# Patient Record
Sex: MALE | Race: BLACK OR AFRICAN AMERICAN | NOT HISPANIC OR LATINO | Employment: FULL TIME | RURAL
[De-identification: names, ages, dates, MRNs, and addresses within clinical notes are randomized per-mention and may not be internally consistent; named-entity substitution may affect disease eponyms.]

---

## 2021-12-12 ENCOUNTER — OFFICE VISIT (OUTPATIENT)
Dept: FAMILY MEDICINE | Facility: CLINIC | Age: 46
End: 2021-12-12
Payer: COMMERCIAL

## 2021-12-12 VITALS
OXYGEN SATURATION: 98 % | TEMPERATURE: 98 F | RESPIRATION RATE: 18 BRPM | DIASTOLIC BLOOD PRESSURE: 81 MMHG | SYSTOLIC BLOOD PRESSURE: 121 MMHG | HEART RATE: 84 BPM

## 2021-12-12 DIAGNOSIS — Z20.828 CONTACT WITH OR EXPOSURE TO VIRAL DISEASE: Primary | ICD-10-CM

## 2021-12-12 LAB
CTP QC/QA: YES
FLUAV AG NPH QL: NEGATIVE
FLUBV AG NPH QL: NEGATIVE
SARS-COV-2 AG RESP QL IA.RAPID: NEGATIVE

## 2021-12-12 PROCEDURE — 99203 PR OFFICE/OUTPT VISIT, NEW, LEVL III, 30-44 MIN: ICD-10-PCS | Mod: ,,, | Performed by: FAMILY MEDICINE

## 2021-12-12 PROCEDURE — 99203 OFFICE O/P NEW LOW 30 MIN: CPT | Mod: ,,, | Performed by: FAMILY MEDICINE

## 2021-12-12 PROCEDURE — 99051 MED SERV EVE/WKEND/HOLIDAY: CPT | Mod: ,,, | Performed by: FAMILY MEDICINE

## 2021-12-12 PROCEDURE — 87428 POCT SARS-COV2 (COVID) WITH FLU ANTIGEN: ICD-10-PCS | Mod: QW,,, | Performed by: FAMILY MEDICINE

## 2021-12-12 PROCEDURE — 99051 PR MEDICAL SERVICES, EVE/WKEND/HOLIDAY: ICD-10-PCS | Mod: ,,, | Performed by: FAMILY MEDICINE

## 2021-12-12 PROCEDURE — 87428 SARSCOV & INF VIR A&B AG IA: CPT | Mod: QW,,, | Performed by: FAMILY MEDICINE

## 2021-12-15 ENCOUNTER — OFFICE VISIT (OUTPATIENT)
Dept: FAMILY MEDICINE | Facility: CLINIC | Age: 46
End: 2021-12-15
Payer: COMMERCIAL

## 2021-12-15 VITALS
DIASTOLIC BLOOD PRESSURE: 70 MMHG | OXYGEN SATURATION: 96 % | RESPIRATION RATE: 18 BRPM | TEMPERATURE: 98 F | HEART RATE: 73 BPM | SYSTOLIC BLOOD PRESSURE: 146 MMHG

## 2021-12-15 DIAGNOSIS — Z20.828 CONTACT WITH OR EXPOSURE TO VIRAL DISEASE: Primary | ICD-10-CM

## 2021-12-15 PROCEDURE — 87428 SARSCOV & INF VIR A&B AG IA: CPT | Mod: QW,,, | Performed by: NURSE PRACTITIONER

## 2021-12-15 PROCEDURE — 87428 POCT SARS-COV2 (COVID) WITH FLU ANTIGEN: ICD-10-PCS | Mod: QW,,, | Performed by: NURSE PRACTITIONER

## 2021-12-15 PROCEDURE — 99202 OFFICE O/P NEW SF 15 MIN: CPT | Mod: ,,, | Performed by: NURSE PRACTITIONER

## 2021-12-15 PROCEDURE — 99202 PR OFFICE/OUTPT VISIT, NEW, LEVL II, 15-29 MIN: ICD-10-PCS | Mod: ,,, | Performed by: NURSE PRACTITIONER

## 2022-05-05 ENCOUNTER — OFFICE VISIT (OUTPATIENT)
Dept: FAMILY MEDICINE | Facility: CLINIC | Age: 47
End: 2022-05-05
Payer: COMMERCIAL

## 2022-05-05 VITALS
HEART RATE: 104 BPM | SYSTOLIC BLOOD PRESSURE: 144 MMHG | WEIGHT: 208.81 LBS | HEIGHT: 70 IN | DIASTOLIC BLOOD PRESSURE: 92 MMHG | BODY MASS INDEX: 29.89 KG/M2 | OXYGEN SATURATION: 96 % | TEMPERATURE: 98 F

## 2022-05-05 DIAGNOSIS — R05.9 COUGH: Primary | ICD-10-CM

## 2022-05-05 DIAGNOSIS — R22.2 LOCALIZED SWELLING, MASS AND LUMP, TRUNK: ICD-10-CM

## 2022-05-05 DIAGNOSIS — J22 LOWER RESPIRATORY INFECTION: ICD-10-CM

## 2022-05-05 LAB
ALBUMIN SERPL BCP-MCNC: 3.9 G/DL (ref 3.5–5)
ALBUMIN/GLOB SERPL: 1.1 {RATIO}
ALP SERPL-CCNC: 70 U/L (ref 45–115)
ALT SERPL W P-5'-P-CCNC: 57 U/L (ref 16–61)
ANION GAP SERPL CALCULATED.3IONS-SCNC: 11 MMOL/L (ref 7–16)
AST SERPL W P-5'-P-CCNC: 50 U/L (ref 15–37)
BILIRUB SERPL-MCNC: 1.2 MG/DL (ref 0–1.2)
BUN SERPL-MCNC: 19 MG/DL (ref 7–18)
BUN/CREAT SERPL: 13 (ref 6–20)
CALCIUM SERPL-MCNC: 8.9 MG/DL (ref 8.5–10.1)
CHLORIDE SERPL-SCNC: 104 MMOL/L (ref 98–107)
CO2 SERPL-SCNC: 27 MMOL/L (ref 21–32)
CREAT SERPL-MCNC: 1.49 MG/DL (ref 0.7–1.3)
CTP QC/QA: YES
FLUAV AG NPH QL: NEGATIVE
FLUBV AG NPH QL: NEGATIVE
GLOBULIN SER-MCNC: 3.7 G/DL (ref 2–4)
GLUCOSE SERPL-MCNC: 93 MG/DL (ref 74–106)
POTASSIUM SERPL-SCNC: 3.9 MMOL/L (ref 3.5–5.1)
PROT SERPL-MCNC: 7.6 G/DL (ref 6.4–8.2)
SARS-COV-2 AG RESP QL IA.RAPID: NEGATIVE
SODIUM SERPL-SCNC: 138 MMOL/L (ref 136–145)

## 2022-05-05 PROCEDURE — 3080F PR MOST RECENT DIASTOLIC BLOOD PRESSURE >= 90 MM HG: ICD-10-PCS | Mod: ,,, | Performed by: NURSE PRACTITIONER

## 2022-05-05 PROCEDURE — 3008F PR BODY MASS INDEX (BMI) DOCUMENTED: ICD-10-PCS | Mod: ,,, | Performed by: NURSE PRACTITIONER

## 2022-05-05 PROCEDURE — 3080F DIAST BP >= 90 MM HG: CPT | Mod: ,,, | Performed by: NURSE PRACTITIONER

## 2022-05-05 PROCEDURE — 1159F MED LIST DOCD IN RCRD: CPT | Mod: ,,, | Performed by: NURSE PRACTITIONER

## 2022-05-05 PROCEDURE — 1160F PR REVIEW ALL MEDS BY PRESCRIBER/CLIN PHARMACIST DOCUMENTED: ICD-10-PCS | Mod: ,,, | Performed by: NURSE PRACTITIONER

## 2022-05-05 PROCEDURE — 87428 POCT SARS-COV2 (COVID) WITH FLU ANTIGEN: ICD-10-PCS | Mod: QW,,, | Performed by: NURSE PRACTITIONER

## 2022-05-05 PROCEDURE — 99051 MED SERV EVE/WKEND/HOLIDAY: CPT | Mod: ,,, | Performed by: NURSE PRACTITIONER

## 2022-05-05 PROCEDURE — 3077F SYST BP >= 140 MM HG: CPT | Mod: ,,, | Performed by: NURSE PRACTITIONER

## 2022-05-05 PROCEDURE — 99051 PR MEDICAL SERVICES, EVE/WKEND/HOLIDAY: ICD-10-PCS | Mod: ,,, | Performed by: NURSE PRACTITIONER

## 2022-05-05 PROCEDURE — 87428 SARSCOV & INF VIR A&B AG IA: CPT | Mod: QW,,, | Performed by: NURSE PRACTITIONER

## 2022-05-05 PROCEDURE — 80053 COMPREHEN METABOLIC PANEL: CPT | Mod: ,,, | Performed by: CLINICAL MEDICAL LABORATORY

## 2022-05-05 PROCEDURE — 3077F PR MOST RECENT SYSTOLIC BLOOD PRESSURE >= 140 MM HG: ICD-10-PCS | Mod: ,,, | Performed by: NURSE PRACTITIONER

## 2022-05-05 PROCEDURE — 1159F PR MEDICATION LIST DOCUMENTED IN MEDICAL RECORD: ICD-10-PCS | Mod: ,,, | Performed by: NURSE PRACTITIONER

## 2022-05-05 PROCEDURE — 1160F RVW MEDS BY RX/DR IN RCRD: CPT | Mod: ,,, | Performed by: NURSE PRACTITIONER

## 2022-05-05 PROCEDURE — 80053 COMPREHENSIVE METABOLIC PANEL: ICD-10-PCS | Mod: ,,, | Performed by: CLINICAL MEDICAL LABORATORY

## 2022-05-05 PROCEDURE — 99214 OFFICE O/P EST MOD 30 MIN: CPT | Mod: ,,, | Performed by: NURSE PRACTITIONER

## 2022-05-05 PROCEDURE — 3008F BODY MASS INDEX DOCD: CPT | Mod: ,,, | Performed by: NURSE PRACTITIONER

## 2022-05-05 PROCEDURE — 99214 PR OFFICE/OUTPT VISIT, EST, LEVL IV, 30-39 MIN: ICD-10-PCS | Mod: ,,, | Performed by: NURSE PRACTITIONER

## 2022-05-05 RX ORDER — PROMETHAZINE HYDROCHLORIDE AND DEXTROMETHORPHAN HYDROBROMIDE 6.25; 15 MG/5ML; MG/5ML
5 SYRUP ORAL EVERY 6 HOURS PRN
Qty: 150 ML | Refills: 0 | Status: SHIPPED | OUTPATIENT
Start: 2022-05-05 | End: 2022-05-15

## 2022-05-05 RX ORDER — ALBUTEROL SULFATE 90 UG/1
2 AEROSOL, METERED RESPIRATORY (INHALATION) EVERY 6 HOURS PRN
Qty: 18 G | Refills: 5 | Status: SHIPPED | OUTPATIENT
Start: 2022-05-05 | End: 2023-02-08 | Stop reason: SDUPTHER

## 2022-05-05 RX ORDER — METHYLPREDNISOLONE 4 MG/1
TABLET ORAL
Qty: 21 EACH | Refills: 0 | Status: SHIPPED | OUTPATIENT
Start: 2022-05-05 | End: 2022-05-26

## 2022-05-05 RX ORDER — LEVOFLOXACIN 750 MG/1
750 TABLET ORAL DAILY
Qty: 7 TABLET | Refills: 0 | Status: SHIPPED | OUTPATIENT
Start: 2022-05-05 | End: 2022-05-12

## 2022-05-05 NOTE — PROGRESS NOTES
"Subjective:       Patient ID: Franky Torres is a 47 y.o. male.    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 No fever. Cough is dry. No hx of asthma. No sinus congestion or PND.      Review of Systems   Constitutional: Negative for chills, fever and malaise/fatigue.   HENT: Negative.    Respiratory: Positive for cough. Negative for hemoptysis, sputum production, shortness of breath and wheezing.    Cardiovascular: Negative.    Musculoskeletal: Negative.    Neurological: Negative.           Reviewed family, medical, surgical, and social history.    Objective:      BP (!) 144/92 (BP Location: Left arm, Patient Position: Sitting, BP Method: Large (Automatic))   Pulse 104   Temp 97.6 °F (36.4 °C) (Oral)   Ht 5' 10" (1.778 m)   Wt 94.7 kg (208 lb 12.8 oz)   SpO2 96%   BMI 29.96 kg/m²   Physical Exam  Vitals and nursing note reviewed.   Constitutional:       General: He is not in acute distress.     Appearance: Normal appearance. He is normal weight. He is not ill-appearing, toxic-appearing or diaphoretic.   HENT:      Head: Normocephalic.      Right Ear: Tympanic membrane, ear canal and external ear normal.      Left Ear: Tympanic membrane, ear canal and external ear normal.      Nose: No congestion or rhinorrhea.      Mouth/Throat:      Mouth: Mucous membranes are moist.   Cardiovascular:      Rate and Rhythm: Normal rate and regular rhythm.      Heart sounds: Normal heart sounds.   Pulmonary:      Effort: Pulmonary effort is normal. No respiratory distress.      Breath sounds: No stridor. Wheezing present. No rhonchi or rales.   Chest:      Chest wall: No tenderness.   Musculoskeletal:      Cervical back: Normal range of motion and neck supple.   Skin:     General: Skin is warm and dry.   Neurological:      Mental Status: He is alert and oriented to person, place, and time.   Psychiatric:   "       Mood and Affect: Mood normal.         Behavior: Behavior normal.         Thought Content: Thought content normal.         Judgment: Judgment normal.            Office Visit on 05/05/2022   Component Date Value Ref Range Status    SARS Coronavirus 2 Antigen 05/05/2022 Negative  Negative Final    Rapid Influenza A Ag 05/05/2022 Negative  Negative Final    Rapid Influenza B Ag 05/05/2022 Negative  Negative Final     Acceptable 05/05/2022 Yes   Final      Assessment:       1. Cough    2. Localized swelling, mass and lump, trunk    3. Lower respiratory infection        Plan:       Cough  -     X-Ray Chest PA And Lateral; Future; Expected date: 05/05/2022  -     methylPREDNISolone (MEDROL DOSEPACK) 4 mg tablet; use as directed  Dispense: 21 each; Refill: 0  -     albuterol (PROVENTIL HFA) 90 mcg/actuation inhaler; Inhale 2 puffs into the lungs every 6 (six) hours as needed for Wheezing. Rescue  Dispense: 18 g; Refill: 5  -     Ambulatory referral/consult to Pulmonology; Future; Expected date: 05/12/2022  -     Cancel: CT Chest Without Contrast; Future; Expected date: 05/05/2022  -     levoFLOXacin (LEVAQUIN) 750 MG tablet; Take 1 tablet (750 mg total) by mouth once daily. for 7 days  Dispense: 7 tablet; Refill: 0    Localized swelling, mass and lump, trunk  -     Cancel: CT Chest Without Contrast; Future; Expected date: 05/05/2022  -     Comprehensive Metabolic Panel; Future; Expected date: 05/05/2022  -     levoFLOXacin (LEVAQUIN) 750 MG tablet; Take 1 tablet (750 mg total) by mouth once daily. for 7 days  Dispense: 7 tablet; Refill: 0  -     CT Chest With Contrast; Future; Expected date: 05/05/2022    Lower respiratory infection  -     levoFLOXacin (LEVAQUIN) 750 MG tablet; Take 1 tablet (750 mg total) by mouth once daily. for 7 days  Dispense: 7 tablet; Refill: 0  -     POCT SARS-COV2 (COVID) with Flu Antigen    Other orders  -     promethazine-dextromethorphan (PROMETHAZINE-DM) 6.25-15 mg/5 mL  Syrp; Take 5 mLs by mouth every 6 (six) hours as needed (cough).  Dispense: 150 mL; Refill: 0    I will call with xray results  F/U with referral clerk in 1 week  RTC PRN          Risks, benefits, and side effects were discussed with the patient. All questions were answered to the fullest satisfaction of the patient, and pt verbalized understanding and agreement to treatment plan. Pt was to call with any new or worsening symptoms, or present to the ER.

## 2022-05-05 NOTE — LETTER
May 5, 2022      [facility]  [address] [place] [address]  Phone: [phone]  Fax: [phone]       Patient: Franky Torres   YOB: 1975  Date of Visit: 05/05/2022    To Whom It May Concern:    Pako Torres  was at [facility] on 05/05/2022. The patient may return to work/school on 06/11/2022 with no restrictions. If you have any questions or concerns, or if I can be of further assistance, please do not hesitate to contact me.    Sincerely,    Trupti Vasquez, RT

## 2022-05-07 NOTE — PROGRESS NOTES
Notify of mildly elevated BUN and Creatinine. Any hx of kidney disease or HTN?. Monitor BP at home daily and notify me if consistently greater than 140/90.

## 2022-05-09 ENCOUNTER — TELEPHONE (OUTPATIENT)
Dept: FAMILY MEDICINE | Facility: CLINIC | Age: 47
End: 2022-05-09
Payer: COMMERCIAL

## 2022-05-09 NOTE — TELEPHONE ENCOUNTER
Identify patient by name and . Results was given. Responded well and voiced understanding. No hx of kidney disease or HTN.----- Message from KIRBY Obrien sent at 2022  8:14 AM CDT -----  Notify of mildly elevated BUN and Creatinine. Any hx of kidney disease or HTN?. Monitor BP at home daily and notify me if consistently greater than 140/90.

## 2022-05-16 ENCOUNTER — HOSPITAL ENCOUNTER (OUTPATIENT)
Dept: RADIOLOGY | Facility: HOSPITAL | Age: 47
Discharge: HOME | End: 2022-05-16
Attending: NURSE PRACTITIONER
Payer: COMMERCIAL

## 2022-05-16 DIAGNOSIS — R22.2 LOCALIZED SWELLING, MASS AND LUMP, TRUNK: ICD-10-CM

## 2022-05-16 PROCEDURE — 71260 CT CHEST WITH CONTRAST: ICD-10-PCS | Mod: 26,,,

## 2022-05-16 PROCEDURE — 71260 CT THORAX DX C+: CPT | Mod: 26,,,

## 2022-05-16 PROCEDURE — 25500020 PHARM REV CODE 255: Performed by: NURSE PRACTITIONER

## 2022-05-16 PROCEDURE — 71260 CT THORAX DX C+: CPT | Mod: TC

## 2022-05-16 RX ADMIN — IOPAMIDOL 75 ML: 755 INJECTION, SOLUTION INTRAVENOUS at 10:05

## 2022-05-18 ENCOUNTER — CLINICAL SUPPORT (OUTPATIENT)
Dept: PRIMARY CARE CLINIC | Facility: CLINIC | Age: 47
End: 2022-05-18

## 2022-05-18 DIAGNOSIS — Z02.89 ENCOUNTER FOR PHYSICAL EXAMINATION RELATED TO EMPLOYMENT: Primary | ICD-10-CM

## 2022-05-18 DIAGNOSIS — Z46.89 ENCOUNTER FOR FITTING AND ADJUSTMENT OF OTHER SPECIFIED DEVICES: ICD-10-CM

## 2022-05-18 DIAGNOSIS — Z01.00 ENCOUNTER FOR VISION SCREENING: ICD-10-CM

## 2022-05-18 DIAGNOSIS — Z02.83 ENCOUNTER FOR DRUG SCREENING: ICD-10-CM

## 2022-05-18 PROCEDURE — 99499 UNLISTED E&M SERVICE: CPT | Mod: ,,, | Performed by: NURSE PRACTITIONER

## 2022-05-18 PROCEDURE — 99172 PR VISUAL FUNCT SCREENING, BILAT: ICD-10-PCS | Mod: ,,, | Performed by: NURSE PRACTITIONER

## 2022-05-18 PROCEDURE — 99172 OCULAR FUNCTION SCREEN: CPT | Mod: ,,, | Performed by: NURSE PRACTITIONER

## 2022-05-18 PROCEDURE — 99080 SPECIAL REPORTS OR FORMS: CPT | Mod: ,,, | Performed by: NURSE PRACTITIONER

## 2022-05-18 PROCEDURE — 99499 PR PHYSICAL - BASIC NON DOT/CDL: ICD-10-PCS | Mod: ,,, | Performed by: NURSE PRACTITIONER

## 2022-05-18 PROCEDURE — 94010 BREATHING CAPACITY TEST: ICD-10-PCS | Mod: ,,, | Performed by: NURSE PRACTITIONER

## 2022-05-18 PROCEDURE — 99000 PR URINE DRUG SCREEN COLLECTION: ICD-10-PCS | Mod: ,,, | Performed by: NURSE PRACTITIONER

## 2022-05-18 PROCEDURE — 94010 BREATHING CAPACITY TEST: CPT | Mod: ,,, | Performed by: NURSE PRACTITIONER

## 2022-05-18 PROCEDURE — 99080 OSHA QUESTIONNAIRE: ICD-10-PCS | Mod: ,,, | Performed by: NURSE PRACTITIONER

## 2022-05-18 PROCEDURE — 99000 SPECIMEN HANDLING OFFICE-LAB: CPT | Mod: ,,, | Performed by: NURSE PRACTITIONER

## 2022-05-18 NOTE — PROGRESS NOTES
Subjective:       Patient ID: Franky Torres is a 47 y.o. male.    Chief Complaint: No chief complaint on file.    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

## 2022-05-20 ENCOUNTER — TELEPHONE (OUTPATIENT)
Dept: FAMILY MEDICINE | Facility: CLINIC | Age: 47
End: 2022-05-20
Payer: COMMERCIAL

## 2022-05-20 NOTE — TELEPHONE ENCOUNTER
Pt viewed results on my chart.----- Message from KIRBY Obrien sent at 5/16/2022 12:31 PM CDT -----  Notify normal CT. How is cough? If not better, I will refer to pulmonology

## 2022-06-20 ENCOUNTER — OFFICE VISIT (OUTPATIENT)
Dept: PULMONOLOGY | Facility: CLINIC | Age: 47
End: 2022-06-20
Payer: COMMERCIAL

## 2022-06-20 VITALS
RESPIRATION RATE: 22 BRPM | HEIGHT: 70 IN | WEIGHT: 208 LBS | OXYGEN SATURATION: 98 % | HEART RATE: 80 BPM | DIASTOLIC BLOOD PRESSURE: 84 MMHG | BODY MASS INDEX: 29.78 KG/M2 | SYSTOLIC BLOOD PRESSURE: 118 MMHG

## 2022-06-20 DIAGNOSIS — R05.9 COUGH: ICD-10-CM

## 2022-06-20 PROCEDURE — 99203 OFFICE O/P NEW LOW 30 MIN: CPT | Mod: S$PBB,,, | Performed by: INTERNAL MEDICINE

## 2022-06-20 PROCEDURE — 3008F BODY MASS INDEX DOCD: CPT | Mod: ,,, | Performed by: INTERNAL MEDICINE

## 2022-06-20 PROCEDURE — 1159F MED LIST DOCD IN RCRD: CPT | Mod: ,,, | Performed by: INTERNAL MEDICINE

## 2022-06-20 PROCEDURE — 3074F SYST BP LT 130 MM HG: CPT | Mod: ,,, | Performed by: INTERNAL MEDICINE

## 2022-06-20 PROCEDURE — 99203 PR OFFICE/OUTPT VISIT, NEW, LEVL III, 30-44 MIN: ICD-10-PCS | Mod: S$PBB,,, | Performed by: INTERNAL MEDICINE

## 2022-06-20 PROCEDURE — 3079F DIAST BP 80-89 MM HG: CPT | Mod: ,,, | Performed by: INTERNAL MEDICINE

## 2022-06-20 PROCEDURE — 3074F PR MOST RECENT SYSTOLIC BLOOD PRESSURE < 130 MM HG: ICD-10-PCS | Mod: ,,, | Performed by: INTERNAL MEDICINE

## 2022-06-20 PROCEDURE — 99214 OFFICE O/P EST MOD 30 MIN: CPT | Mod: PBBFAC | Performed by: INTERNAL MEDICINE

## 2022-06-20 PROCEDURE — 1159F PR MEDICATION LIST DOCUMENTED IN MEDICAL RECORD: ICD-10-PCS | Mod: ,,, | Performed by: INTERNAL MEDICINE

## 2022-06-20 PROCEDURE — 3008F PR BODY MASS INDEX (BMI) DOCUMENTED: ICD-10-PCS | Mod: ,,, | Performed by: INTERNAL MEDICINE

## 2022-06-20 PROCEDURE — 3079F PR MOST RECENT DIASTOLIC BLOOD PRESSURE 80-89 MM HG: ICD-10-PCS | Mod: ,,, | Performed by: INTERNAL MEDICINE

## 2022-06-20 NOTE — ASSESSMENT & PLAN NOTE
Patient presents today for an acute episode with cough the left upper lobe infiltrate repeat CT in 11 days was felt to be normal his cough is and only there when he gets exposed to certain pain at work [employer] he is currently asymptomatic is only cough a couple times a last couple weeks resolved with albuterol he very well may have issues related to his exposures work but he wears ventilator I had seen back is the cough continues work does not completely go away or it gets worse right now just watch

## 2022-06-20 NOTE — PROGRESS NOTES
Subjective:       Patient ID: Franky Torres is a 47 y.o. male.    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 The current episode started today. The problem has been unchanged. Pertinent negatives include no chest pain, chills, ear pain, eye redness, headaches or rash. Nothing aggravates the symptoms. He has tried a beta-agonist inhaler for the symptoms. The treatment provided moderate relief.     Past Medical History:   Diagnosis Date    Cough      No past surgical history on file.  History reviewed. No pertinent family history.  Review of patient's allergies indicates:  No Known Allergies   Social History     Tobacco Use    Smoking status: Never Smoker    Smokeless tobacco: Never Used   Substance Use Topics    Alcohol use: Not Currently    Drug use: Never      Review of Systems   Constitutional: Negative for chills, activity change and night sweats.   HENT: Negative for congestion and ear pain.    Eyes: Negative for redness and itching.   Respiratory: Positive for cough.    Cardiovascular: Negative for chest pain and palpitations.   Musculoskeletal: Negative for arthralgias and back pain.   Skin: Negative for rash.   Gastrointestinal: Negative for abdominal pain and abdominal distention.   Neurological: Negative for dizziness and headaches.   Hematological: Negative for adenopathy. Does not bruise/bleed easily.   Psychiatric/Behavioral: Negative for confusion. The patient is not nervous/anxious.        Objective:      Physical Exam   Constitutional: He is oriented to person, place, and time. He appears well-developed and well-nourished.   HENT:   Head: Normocephalic.   Nose: Nose normal.   Mouth/Throat: Oropharynx is clear and moist.   Neck: No JVD present. No thyromegaly present.   Cardiovascular: Normal rate, regular rhythm, normal heart sounds and intact distal pulses.    Pulmonary/Chest: Normal expansion, hyperinflation, symmetric chest wall expansion, effort normal and breath sounds normal.   Abdominal: Soft. Bowel sounds are normal.   Musculoskeletal:         General: Normal range of motion.      Cervical back: Normal range of motion and neck supple.   Lymphadenopathy: No supraclavicular adenopathy is present.     He has no cervical adenopathy.   Neurological: He is alert and oriented to person, place, and time. He has normal reflexes.   Skin: Skin is warm and dry.   Psychiatric: He has a normal mood and affect. His behavior is normal.     Personal Diagnostic Review  CT chest reviewed in is normal    Pulmonary Function Tests 6/20/2022   SpO2 98   Height 70   Weight 3328   BMI (Calculated) 29.8   Some recent data might be hidden         Assessment:       1. Cough        Outpatient Encounter Medications as of 6/20/2022   Medication Sig Dispense Refill    albuterol (PROVENTIL HFA) 90 mcg/actuation inhaler Inhale 2 puffs into the lungs every 6 (six) hours as needed for Wheezing. Rescue 18 g 5     No facility-administered encounter medications on file as of 6/20/2022.     No orders of the defined types were placed in this encounter.      Plan:       Problem List Items Addressed This Visit        Pulmonary    Cough     Patient presents today for an acute episode with cough the left upper lobe infiltrate repeat CT in 11 days was felt to be normal his cough is and only there when he gets exposed to certain pain at work [employer] he is currently asymptomatic is only cough a couple times a last couple weeks resolved with albuterol he very well may have issues related to his exposures work but he wears ventilator I had seen back is the cough continues work does not completely go away or it gets worse right now just watch

## 2022-07-13 ENCOUNTER — OFFICE VISIT (OUTPATIENT)
Dept: FAMILY MEDICINE | Facility: CLINIC | Age: 47
End: 2022-07-13
Payer: COMMERCIAL

## 2022-07-13 VITALS
SYSTOLIC BLOOD PRESSURE: 116 MMHG | OXYGEN SATURATION: 97 % | WEIGHT: 204 LBS | DIASTOLIC BLOOD PRESSURE: 70 MMHG | HEART RATE: 93 BPM | BODY MASS INDEX: 29.2 KG/M2 | HEIGHT: 70 IN | TEMPERATURE: 98 F

## 2022-07-13 DIAGNOSIS — U07.1 COVID-19: Primary | ICD-10-CM

## 2022-07-13 DIAGNOSIS — R52 BODY ACHES: ICD-10-CM

## 2022-07-13 DIAGNOSIS — Z20.822 CONTACT WITH AND (SUSPECTED) EXPOSURE TO COVID-19: ICD-10-CM

## 2022-07-13 LAB
CTP QC/QA: YES
CTP QC/QA: YES
FLUAV AG NPH QL: NEGATIVE
FLUBV AG NPH QL: NEGATIVE
SARS-COV-2 AG RESP QL IA.RAPID: POSITIVE

## 2022-07-13 PROCEDURE — 87426 SARS CORONAVIRUS 2 ANTIGEN POCT: ICD-10-PCS | Mod: QW,,, | Performed by: NURSE PRACTITIONER

## 2022-07-13 PROCEDURE — 87804 INFLUENZA ASSAY W/OPTIC: CPT | Mod: QW,,, | Performed by: NURSE PRACTITIONER

## 2022-07-13 PROCEDURE — 1159F MED LIST DOCD IN RCRD: CPT | Mod: ,,, | Performed by: NURSE PRACTITIONER

## 2022-07-13 PROCEDURE — 3078F PR MOST RECENT DIASTOLIC BLOOD PRESSURE < 80 MM HG: ICD-10-PCS | Mod: ,,, | Performed by: NURSE PRACTITIONER

## 2022-07-13 PROCEDURE — 3074F PR MOST RECENT SYSTOLIC BLOOD PRESSURE < 130 MM HG: ICD-10-PCS | Mod: ,,, | Performed by: NURSE PRACTITIONER

## 2022-07-13 PROCEDURE — 87804 POCT INFLUENZA A/B: ICD-10-PCS | Mod: QW,,, | Performed by: NURSE PRACTITIONER

## 2022-07-13 PROCEDURE — 87426 SARSCOV CORONAVIRUS AG IA: CPT | Mod: QW,,, | Performed by: NURSE PRACTITIONER

## 2022-07-13 PROCEDURE — 3078F DIAST BP <80 MM HG: CPT | Mod: ,,, | Performed by: NURSE PRACTITIONER

## 2022-07-13 PROCEDURE — 1160F RVW MEDS BY RX/DR IN RCRD: CPT | Mod: ,,, | Performed by: NURSE PRACTITIONER

## 2022-07-13 PROCEDURE — 99213 OFFICE O/P EST LOW 20 MIN: CPT | Mod: ,,, | Performed by: NURSE PRACTITIONER

## 2022-07-13 PROCEDURE — 3008F BODY MASS INDEX DOCD: CPT | Mod: ,,, | Performed by: NURSE PRACTITIONER

## 2022-07-13 PROCEDURE — 3074F SYST BP LT 130 MM HG: CPT | Mod: ,,, | Performed by: NURSE PRACTITIONER

## 2022-07-13 PROCEDURE — 1159F PR MEDICATION LIST DOCUMENTED IN MEDICAL RECORD: ICD-10-PCS | Mod: ,,, | Performed by: NURSE PRACTITIONER

## 2022-07-13 PROCEDURE — 3008F PR BODY MASS INDEX (BMI) DOCUMENTED: ICD-10-PCS | Mod: ,,, | Performed by: NURSE PRACTITIONER

## 2022-07-13 PROCEDURE — 99213 PR OFFICE/OUTPT VISIT, EST, LEVL III, 20-29 MIN: ICD-10-PCS | Mod: ,,, | Performed by: NURSE PRACTITIONER

## 2022-07-13 PROCEDURE — 1160F PR REVIEW ALL MEDS BY PRESCRIBER/CLIN PHARMACIST DOCUMENTED: ICD-10-PCS | Mod: ,,, | Performed by: NURSE PRACTITIONER

## 2022-07-13 NOTE — LETTER
July 13, 2022    Franky Torres  [address] [facility] [place] [address]             [facility]  [address] [facility] [place] [address]  Phone: [phone]  Fax: [phone]   July 13, 2022     Patient: Franky Torres   YOB: 1975   Date of Visit: 7/13/2022       To Whom it May Concern:    Franky Torres was seen in my clinic on 7/13/2022. He may return to work on 7/17/22 and then must wear a mask for 5 additional days until the 7/22/22.    Please excuse him from any classes or work missed.    If you have any questions or concerns, please don't hesitate to call.    Sincerely,         Laureen Weeks, ELSAP

## 2022-07-13 NOTE — PROGRESS NOTES
[facility]    Chief Complaint      Chief Complaint   Patient presents with    Generalized Body Aches     Began yesterday    Headache     Began yesterday    Nasal Congestion     X 2 days    Chills    Documentation Only     Not vac, will need excuse       History of Present Illness      Franky Torres is a 47 y.o. male with no chronic conditions who presents today for URI type symptoms x2 days.    Past Medical History:  Past Medical History:   Diagnosis Date    Cough        Past Surgical History:   has no past surgical history on file.    Social History:  Social History     Tobacco Use    Smoking status: Never Smoker    Smokeless tobacco: Never Used   Substance Use Topics    Alcohol use: Not Currently    Drug use: Never       I personally reviewed all past medical, surgical, and social.     Review of Systems   Constitutional: Positive for chills. Negative for fatigue and fever.   HENT: Positive for congestion. Negative for rhinorrhea, sneezing and sore throat.    Gastrointestinal: Negative for diarrhea, nausea and vomiting.   Musculoskeletal: Positive for myalgias.   Skin: Negative for rash.   Neurological: Positive for headaches.        Medications:  Outpatient Encounter Medications as of 7/13/2022   Medication Sig Note Dispense Refill    albuterol (PROVENTIL HFA) 90 mcg/actuation inhaler Inhale 2 puffs into the lungs every 6 (six) hours as needed for Wheezing. Rescue 6/20/2022: Only used once: did help 18 g 5    nirmatrelvir-ritonavir 300 mg (150 mg x 2)-100 mg copackaged tablets (EUA) Take 3 tablets by mouth 2 (two) times daily for 5 days. Each dose contains 2 nirmatrelvir (pink tablets) and 1 ritonavir (white tablet). Take all 3 tablets together  30 tablet 0     No facility-administered encounter medications on file as of 7/13/2022.       Allergies:  Review of patient's allergies indicates:  No Known Allergies    Health Maintenance:    There is no immunization history on file for this  "patient.   Health Maintenance   Topic Date Due    Hepatitis C Screening  Never done    TETANUS VACCINE  Never done    Lipid Panel  06/30/2026        Physical Exam      Vital Signs  Temp: 98.3 °F (36.8 °C)  Temp src: Oral  Pulse: 93  SpO2: 97 %  BP: 116/70  BP Location: Left arm  Patient Position: Sitting  Height and Weight  Height: 5' 10" (177.8 cm)  Weight: 92.5 kg (204 lb)  BSA (Calculated - sq m): 2.14 sq meters  BMI (Calculated): 29.3  Weight in (lb) to have BMI = 25: 173.9]    Physical Exam  Vitals and nursing note reviewed.   Constitutional:       General: He is not in acute distress.     Appearance: He is ill-appearing.   HENT:      Head: Normocephalic.      Right Ear: Hearing normal.      Left Ear: Hearing normal.      Nose: Congestion present.   Eyes:      General: Lids are normal.      Extraocular Movements: Extraocular movements intact.      Conjunctiva/sclera: Conjunctivae normal.      Pupils: Pupils are equal, round, and reactive to light.   Neck:      Thyroid: No thyromegaly.   Cardiovascular:      Rate and Rhythm: Normal rate.   Pulmonary:      Effort: Pulmonary effort is normal. No respiratory distress.   Musculoskeletal:         General: Normal range of motion.      Cervical back: Normal range of motion and neck supple.   Skin:     General: Skin is warm and dry.   Neurological:      General: No focal deficit present.      Mental Status: He is alert and oriented to person, place, and time.      Gait: Gait is intact.          Laboratory:  CBC:  Recent Labs   Lab 06/30/21  1330 11/08/21  1109 05/25/22  0908   WBC 5.34 5.20 5.85   RBC 5.37 5.79 6.01   Hemoglobin 15.2 16.9 17.7   Hematocrit 45.5 49.7 51.4   Platelet Count 159 168 147 L   MCV 84.7 85.8 85.5   MCH 28.3 29.2 29.5   MCHC 33.4 34.0 34.4     CMP:  Recent Labs   Lab 06/30/21  1330 05/05/22  1824 05/25/22  0908   Glucose 118 H 93 145 H   Calcium 8.5 8.9 8.9   Albumin 3.5 3.9 3.6   Total Protein 6.9 7.6 6.7   Sodium 140 138 136   Potassium 4.1 " 3.9 4.4   CO2 28 27 25   Chloride 106 104 104   BUN 19 H 19 H 21 H   Alk Phos 71 70 59   ALT 62 H 57 56   AST 48 H 50 H 47 H   Bilirubin, Total 0.5 1.2 1.5 H     LIPIDS:  Recent Labs   Lab 06/30/21  1330   TSH 1.080   HDL Cholesterol 54   Cholesterol 154   Triglycerides 80   LDL Calculated 84   Cholesterol/HDL Ratio (Risk Factor) 2.9   Non-     TSH:  Recent Labs   Lab 06/30/21  1330   TSH 1.080     A1C:        Assessment/Plan     Franky Torres is a 47 y.o.male with:     1. Contact with and (suspected) exposure to covid-19  - SARS Coronavirus 2 Antigen, POCT    2. Body aches  - POCT Influenza A/B    3. COVID-19  - nirmatrelvir-ritonavir 300 mg (150 mg x 2)-100 mg copackaged tablets (EUA); Take 3 tablets by mouth 2 (two) times daily for 5 days. Each dose contains 2 nirmatrelvir (pink tablets) and 1 ritonavir (white tablet). Take all 3 tablets together  Dispense: 30 tablet; Refill: 0     May return to work on 7/17/22 and then must wear a mask for 5 additional days until the 7/22/22      Total time spent face-to-face and non-face-to-face coordinating care for this encounter was: 30 min    Chronic conditions status updated as per HPI.  Other than changes above, cont current medications and maintain follow up with specialists.  Return to clinic as needed.    Laureen Weeks, ELSAP  [facility]

## 2023-02-08 ENCOUNTER — OFFICE VISIT (OUTPATIENT)
Dept: FAMILY MEDICINE | Facility: CLINIC | Age: 48
End: 2023-02-08
Payer: COMMERCIAL

## 2023-02-08 VITALS
OXYGEN SATURATION: 97 % | HEART RATE: 95 BPM | TEMPERATURE: 98 F | DIASTOLIC BLOOD PRESSURE: 82 MMHG | WEIGHT: 206 LBS | HEIGHT: 70 IN | SYSTOLIC BLOOD PRESSURE: 120 MMHG | BODY MASS INDEX: 29.49 KG/M2

## 2023-02-08 DIAGNOSIS — Z20.822 CONTACT WITH AND (SUSPECTED) EXPOSURE TO COVID-19: ICD-10-CM

## 2023-02-08 DIAGNOSIS — R06.2 WHEEZING: ICD-10-CM

## 2023-02-08 DIAGNOSIS — J06.9 UPPER RESPIRATORY TRACT INFECTION, UNSPECIFIED TYPE: Primary | ICD-10-CM

## 2023-02-08 DIAGNOSIS — R06.02 SHORTNESS OF BREATH: ICD-10-CM

## 2023-02-08 PROCEDURE — 3074F SYST BP LT 130 MM HG: CPT | Mod: ,,, | Performed by: NURSE PRACTITIONER

## 2023-02-08 PROCEDURE — 94640 AIRWAY INHALATION TREATMENT: CPT | Mod: ,,, | Performed by: NURSE PRACTITIONER

## 2023-02-08 PROCEDURE — 1159F MED LIST DOCD IN RCRD: CPT | Mod: ,,, | Performed by: NURSE PRACTITIONER

## 2023-02-08 PROCEDURE — 96372 PR INJECTION,THERAP/PROPH/DIAG2ST, IM OR SUBCUT: ICD-10-PCS | Mod: 59,,, | Performed by: NURSE PRACTITIONER

## 2023-02-08 PROCEDURE — 3079F DIAST BP 80-89 MM HG: CPT | Mod: ,,, | Performed by: NURSE PRACTITIONER

## 2023-02-08 PROCEDURE — 3074F PR MOST RECENT SYSTOLIC BLOOD PRESSURE < 130 MM HG: ICD-10-PCS | Mod: ,,, | Performed by: NURSE PRACTITIONER

## 2023-02-08 PROCEDURE — 3008F BODY MASS INDEX DOCD: CPT | Mod: ,,, | Performed by: NURSE PRACTITIONER

## 2023-02-08 PROCEDURE — 1159F PR MEDICATION LIST DOCUMENTED IN MEDICAL RECORD: ICD-10-PCS | Mod: ,,, | Performed by: NURSE PRACTITIONER

## 2023-02-08 PROCEDURE — 1160F RVW MEDS BY RX/DR IN RCRD: CPT | Mod: ,,, | Performed by: NURSE PRACTITIONER

## 2023-02-08 PROCEDURE — 99214 OFFICE O/P EST MOD 30 MIN: CPT | Mod: 25,,, | Performed by: NURSE PRACTITIONER

## 2023-02-08 PROCEDURE — 3008F PR BODY MASS INDEX (BMI) DOCUMENTED: ICD-10-PCS | Mod: ,,, | Performed by: NURSE PRACTITIONER

## 2023-02-08 PROCEDURE — 3079F PR MOST RECENT DIASTOLIC BLOOD PRESSURE 80-89 MM HG: ICD-10-PCS | Mod: ,,, | Performed by: NURSE PRACTITIONER

## 2023-02-08 PROCEDURE — 87428 POCT SARS-COV2 (COVID) WITH FLU ANTIGEN: ICD-10-PCS | Mod: QW,,, | Performed by: NURSE PRACTITIONER

## 2023-02-08 PROCEDURE — 87428 SARSCOV & INF VIR A&B AG IA: CPT | Mod: QW,,, | Performed by: NURSE PRACTITIONER

## 2023-02-08 PROCEDURE — 96372 THER/PROPH/DIAG INJ SC/IM: CPT | Mod: 59,,, | Performed by: NURSE PRACTITIONER

## 2023-02-08 PROCEDURE — 1160F PR REVIEW ALL MEDS BY PRESCRIBER/CLIN PHARMACIST DOCUMENTED: ICD-10-PCS | Mod: ,,, | Performed by: NURSE PRACTITIONER

## 2023-02-08 PROCEDURE — 94640 PR INHAL RX, AIRWAY OBST/DX SPUTUM INDUCT: ICD-10-PCS | Mod: ,,, | Performed by: NURSE PRACTITIONER

## 2023-02-08 PROCEDURE — 99214 PR OFFICE/OUTPT VISIT, EST, LEVL IV, 30-39 MIN: ICD-10-PCS | Mod: 25,,, | Performed by: NURSE PRACTITIONER

## 2023-02-08 RX ORDER — AMOXICILLIN AND CLAVULANATE POTASSIUM 875; 125 MG/1; MG/1
1 TABLET, FILM COATED ORAL 2 TIMES DAILY
Qty: 14 TABLET | Refills: 0 | Status: SHIPPED | OUTPATIENT
Start: 2023-02-08 | End: 2023-02-15

## 2023-02-08 RX ORDER — ALBUTEROL SULFATE 90 UG/1
2 AEROSOL, METERED RESPIRATORY (INHALATION) EVERY 6 HOURS PRN
Qty: 18 G | Refills: 5 | Status: SHIPPED | OUTPATIENT
Start: 2023-02-08 | End: 2023-08-15

## 2023-02-08 RX ORDER — BENZONATATE 100 MG/1
100 CAPSULE ORAL 3 TIMES DAILY PRN
Qty: 30 CAPSULE | Refills: 0 | Status: SHIPPED | OUTPATIENT
Start: 2023-02-08 | End: 2023-08-15

## 2023-02-08 RX ORDER — DEXAMETHASONE SODIUM PHOSPHATE 4 MG/ML
4 INJECTION, SOLUTION INTRA-ARTICULAR; INTRALESIONAL; INTRAMUSCULAR; INTRAVENOUS; SOFT TISSUE
Status: COMPLETED | OUTPATIENT
Start: 2023-02-08 | End: 2023-02-08

## 2023-02-08 RX ORDER — IPRATROPIUM BROMIDE 21 UG/1
2 SPRAY, METERED NASAL 2 TIMES DAILY
Qty: 30 ML | Refills: 0 | Status: SHIPPED | OUTPATIENT
Start: 2023-02-08 | End: 2023-08-15

## 2023-02-08 RX ORDER — METHYLPREDNISOLONE ACETATE 40 MG/ML
40 INJECTION, SUSPENSION INTRA-ARTICULAR; INTRALESIONAL; INTRAMUSCULAR; SOFT TISSUE ONCE
Status: COMPLETED | OUTPATIENT
Start: 2023-02-08 | End: 2023-02-08

## 2023-02-08 RX ORDER — IPRATROPIUM BROMIDE AND ALBUTEROL SULFATE 2.5; .5 MG/3ML; MG/3ML
3 SOLUTION RESPIRATORY (INHALATION)
Status: COMPLETED | OUTPATIENT
Start: 2023-02-08 | End: 2023-02-08

## 2023-02-08 RX ORDER — PREDNISONE 20 MG/1
20 TABLET ORAL DAILY
Qty: 5 TABLET | Refills: 0 | Status: SHIPPED | OUTPATIENT
Start: 2023-02-08 | End: 2023-08-15

## 2023-02-08 RX ADMIN — METHYLPREDNISOLONE ACETATE 40 MG: 40 INJECTION, SUSPENSION INTRA-ARTICULAR; INTRALESIONAL; INTRAMUSCULAR; SOFT TISSUE at 08:02

## 2023-02-08 RX ADMIN — IPRATROPIUM BROMIDE AND ALBUTEROL SULFATE 3 ML: 2.5; .5 SOLUTION RESPIRATORY (INHALATION) at 08:02

## 2023-02-08 RX ADMIN — DEXAMETHASONE SODIUM PHOSPHATE 4 MG: 4 INJECTION, SOLUTION INTRA-ARTICULAR; INTRALESIONAL; INTRAMUSCULAR; INTRAVENOUS; SOFT TISSUE at 08:02

## 2023-02-08 NOTE — PROGRESS NOTES
[facility]    Chief Complaint      Chief Complaint   Patient presents with    Cough     Mainly at night    Nasal Congestion    Documentation Only     States works around pain and making little sob; x3d    Fatigue       History of Present Illness      Franky Torres is a 47 y.o. male. He  has a past medical history of Cough., who presents today for URI type symptoms x3 days.     Past Medical History:  Past Medical History:   Diagnosis Date    Cough        Past Surgical History:   has no past surgical history on file.    Social History:  Social History     Tobacco Use    Smoking status: Never    Smokeless tobacco: Never   Substance Use Topics    Alcohol use: Not Currently    Drug use: Never       I personally reviewed all past medical, surgical, and social.     Review of Systems   Constitutional:  Positive for fatigue. Negative for chills and fever.   HENT:  Positive for congestion. Negative for rhinorrhea, sneezing and sore throat.    Respiratory:  Positive for cough, chest tightness, shortness of breath and wheezing.    Gastrointestinal:  Negative for diarrhea, nausea and vomiting.   Musculoskeletal:  Negative for myalgias.   Skin:  Negative for rash.   Neurological:  Negative for headaches.      Medications:  Outpatient Encounter Medications as of 2/8/2023   Medication Sig Note Dispense Refill    albuterol (PROVENTIL HFA) 90 mcg/actuation inhaler Inhale 2 puffs into the lungs every 6 (six) hours as needed for Wheezing or Shortness of Breath. Rescue  18 g 5    amoxicillin-clavulanate 875-125mg (AUGMENTIN) 875-125 mg per tablet Take 1 tablet by mouth 2 (two) times daily. for 7 days  14 tablet 0    benzonatate (TESSALON) 100 MG capsule Take 1 capsule (100 mg total) by mouth 3 (three) times daily as needed for Cough.  30 capsule 0    ipratropium (ATROVENT) 21 mcg (0.03 %) nasal spray 2 sprays by Each Nostril route 2 (two) times daily.  30 mL 0    predniSONE (DELTASONE) 20 MG tablet Take 1 tablet (20 mg total)  "by mouth once daily.  5 tablet 0    [DISCONTINUED] albuterol (PROVENTIL HFA) 90 mcg/actuation inhaler Inhale 2 puffs into the lungs every 6 (six) hours as needed for Wheezing. Rescue (Patient not taking: Reported on 2/8/2023) 6/20/2022: Only used once: did help 18 g 5     Facility-Administered Encounter Medications as of 2/8/2023   Medication Dose Route Frequency Provider Last Rate Last Admin    [COMPLETED] albuterol-ipratropium 2.5 mg-0.5 mg/3 mL nebulizer solution 3 mL  3 mL Nebulization 1 time in Clinic/HOD Laureen Weeks, FNP   3 mL at 02/08/23 0833    [COMPLETED] dexAMETHasone injection 4 mg  4 mg Intramuscular 1 time in Clinic/HOD Laureen Weeks, FNP   4 mg at 02/08/23 0843    [COMPLETED] methylPREDNISolone acetate injection 40 mg  40 mg Intramuscular Once Laureenyulia Weeks, FNP   40 mg at 02/08/23 0844       Allergies:  Review of patient's allergies indicates:  No Known Allergies    Health Maintenance:    There is no immunization history on file for this patient.   Health Maintenance   Topic Date Due    Hepatitis C Screening  Never done    TETANUS VACCINE  Never done    Lipid Panel  06/30/2026        Physical Exam      Vital Signs  Temp: 98.2 °F (36.8 °C)  Temp src: Oral  Pulse: 95  SpO2: 97 %  BP: 120/82  Height and Weight  Height: 5' 10" (177.8 cm)  Weight: 93.4 kg (206 lb)  BSA (Calculated - sq m): 2.15 sq meters  BMI (Calculated): 29.6  Weight in (lb) to have BMI = 25: 173.9]    Physical Exam  Vitals and nursing note reviewed.   Constitutional:       Appearance: Normal appearance.   HENT:      Head: Normocephalic.      Right Ear: Hearing, ear canal and external ear normal. Tympanic membrane is bulging.      Left Ear: Hearing, ear canal and external ear normal. Tympanic membrane is bulging.      Nose: Congestion present.      Mouth/Throat:      Lips: Pink.      Pharynx: Oropharynx is clear.   Eyes:      General: Lids are normal.      Conjunctiva/sclera: Conjunctivae normal.   Neck:      Thyroid: No " thyromegaly.   Cardiovascular:      Rate and Rhythm: Normal rate and regular rhythm.      Heart sounds: Normal heart sounds.   Pulmonary:      Effort: Pulmonary effort is normal.      Breath sounds: Wheezing present.   Musculoskeletal:         General: Normal range of motion.      Cervical back: Normal range of motion and neck supple.   Skin:     General: Skin is warm and dry.   Neurological:      General: No focal deficit present.      Mental Status: He is alert and oriented to person, place, and time.      Gait: Gait is intact.        Laboratory:  CBC:  Recent Labs   Lab 06/30/21  1330 11/08/21  1109 05/25/22  0908   WBC 5.34 5.20 5.85   RBC 5.37 5.79 6.01   Hemoglobin 15.2 16.9 17.7   Hematocrit 45.5 49.7 51.4   Platelet Count 159 168 147 L   MCV 84.7 85.8 85.5   MCH 28.3 29.2 29.5   MCHC 33.4 34.0 34.4     CMP:  Recent Labs   Lab 06/30/21  1330 05/05/22  1824 05/25/22  0908   Glucose 118 H 93 145 H   Calcium 8.5 8.9 8.9   Albumin 3.5 3.9 3.6   Total Protein 6.9 7.6 6.7   Sodium 140 138 136   Potassium 4.1 3.9 4.4   CO2 28 27 25   Chloride 106 104 104   BUN 19 H 19 H 21 H   Alk Phos 71 70 59   ALT 62 H 57 56   AST 48 H 50 H 47 H   Bilirubin, Total 0.5 1.2 1.5 H     LIPIDS:  Recent Labs   Lab 06/30/21  1330   TSH 1.080   HDL Cholesterol 54   Cholesterol 154   Triglycerides 80   LDL Calculated 84   Cholesterol/HDL Ratio (Risk Factor) 2.9   Non-     TSH:  Recent Labs   Lab 06/30/21  1330   TSH 1.080     A1C:        Assessment/Plan     Franky Torres is a 47 y.o.male with:     1. Contact with and (suspected) exposure to covid-19  - POCT SARS-COV2 (COVID) with Flu Antigen    2. Wheezing  - methylPREDNISolone acetate injection 40 mg  - dexAMETHasone injection 4 mg  - albuterol-ipratropium 2.5 mg-0.5 mg/3 mL nebulizer solution 3 mL  - albuterol (PROVENTIL HFA) 90 mcg/actuation inhaler; Inhale 2 puffs into the lungs every 6 (six) hours as needed for Wheezing or Shortness of Breath. Rescue  Dispense: 18 g; Refill:  5    3. Upper respiratory tract infection, unspecified type  - ipratropium (ATROVENT) 21 mcg (0.03 %) nasal spray; 2 sprays by Each Nostril route 2 (two) times daily.  Dispense: 30 mL; Refill: 0  - predniSONE (DELTASONE) 20 MG tablet; Take 1 tablet (20 mg total) by mouth once daily.  Dispense: 5 tablet; Refill: 0  - benzonatate (TESSALON) 100 MG capsule; Take 1 capsule (100 mg total) by mouth 3 (three) times daily as needed for Cough.  Dispense: 30 capsule; Refill: 0  - amoxicillin-clavulanate 875-125mg (AUGMENTIN) 875-125 mg per tablet; Take 1 tablet by mouth 2 (two) times daily. for 7 days  Dispense: 14 tablet; Refill: 0    4. Shortness of breath       Patient was given a Duo-neb in clinic- wheezing resolved after 1 treatment      Total time spent face-to-face and non-face-to-face coordinating care for this encounter was: 30 minutes     Chronic conditions status updated as per HPI.  Other than changes above, cont current medications and maintain follow up with specialists.  Return to clinic prn if symptoms worsen or fail to improve.    Laureen Weeks, ELSAP  [facility]

## 2023-02-08 NOTE — LETTER
February 8, 2023    Franky Torres  [address] [facility] [place] [address]             [facility] [address] [facility]  Family Medicine  [facility] [address] [facility] [place] [address]  Phone: [phone]  Fax: [phone]   February 8, 2023     Patient: Franky Torres   YOB: 1975   Date of Visit: 2/8/2023       To Whom it May Concern:    Franky Torres was seen in my clinic on 2/8/2023. He may return to work on 2/9/23 .    Please excuse him from any classes or work missed.    If you have any questions or concerns, please don't hesitate to call.    Sincerely,         KIRBY Mohamud

## 2023-03-28 ENCOUNTER — PATIENT MESSAGE (OUTPATIENT)
Dept: RESEARCH | Facility: HOSPITAL | Age: 48
End: 2023-03-28

## 2023-08-15 ENCOUNTER — OFFICE VISIT (OUTPATIENT)
Dept: FAMILY MEDICINE | Facility: CLINIC | Age: 48
End: 2023-08-15
Payer: COMMERCIAL

## 2023-08-15 VITALS
OXYGEN SATURATION: 95 % | HEART RATE: 82 BPM | HEIGHT: 70 IN | SYSTOLIC BLOOD PRESSURE: 116 MMHG | BODY MASS INDEX: 29.2 KG/M2 | WEIGHT: 204 LBS | DIASTOLIC BLOOD PRESSURE: 86 MMHG | TEMPERATURE: 98 F

## 2023-08-15 DIAGNOSIS — J01.00 ACUTE NON-RECURRENT MAXILLARY SINUSITIS: Primary | ICD-10-CM

## 2023-08-15 DIAGNOSIS — J42 CHRONIC BRONCHITIS, UNSPECIFIED CHRONIC BRONCHITIS TYPE: ICD-10-CM

## 2023-08-15 DIAGNOSIS — R06.2 WHEEZING: ICD-10-CM

## 2023-08-15 DIAGNOSIS — R05.1 ACUTE COUGH: ICD-10-CM

## 2023-08-15 PROCEDURE — 96372 PR INJECTION,THERAP/PROPH/DIAG2ST, IM OR SUBCUT: ICD-10-PCS | Mod: ICN,,, | Performed by: NURSE PRACTITIONER

## 2023-08-15 PROCEDURE — 3074F SYST BP LT 130 MM HG: CPT | Mod: ICN,,, | Performed by: NURSE PRACTITIONER

## 2023-08-15 PROCEDURE — 3074F PR MOST RECENT SYSTOLIC BLOOD PRESSURE < 130 MM HG: ICD-10-PCS | Mod: ICN,,, | Performed by: NURSE PRACTITIONER

## 2023-08-15 PROCEDURE — 1159F MED LIST DOCD IN RCRD: CPT | Mod: ICN,,, | Performed by: NURSE PRACTITIONER

## 2023-08-15 PROCEDURE — 99051 PR MEDICAL SERVICES, EVE/WKEND/HOLIDAY: ICD-10-PCS | Mod: ICN,,, | Performed by: NURSE PRACTITIONER

## 2023-08-15 PROCEDURE — 99213 OFFICE O/P EST LOW 20 MIN: CPT | Mod: 25,ICN,, | Performed by: NURSE PRACTITIONER

## 2023-08-15 PROCEDURE — 3079F PR MOST RECENT DIASTOLIC BLOOD PRESSURE 80-89 MM HG: ICD-10-PCS | Mod: ICN,,, | Performed by: NURSE PRACTITIONER

## 2023-08-15 PROCEDURE — 1159F PR MEDICATION LIST DOCUMENTED IN MEDICAL RECORD: ICD-10-PCS | Mod: ICN,,, | Performed by: NURSE PRACTITIONER

## 2023-08-15 PROCEDURE — 3008F BODY MASS INDEX DOCD: CPT | Mod: ICN,,, | Performed by: NURSE PRACTITIONER

## 2023-08-15 PROCEDURE — 3079F DIAST BP 80-89 MM HG: CPT | Mod: ICN,,, | Performed by: NURSE PRACTITIONER

## 2023-08-15 PROCEDURE — 99051 MED SERV EVE/WKEND/HOLIDAY: CPT | Mod: ICN,,, | Performed by: NURSE PRACTITIONER

## 2023-08-15 PROCEDURE — 3008F PR BODY MASS INDEX (BMI) DOCUMENTED: ICD-10-PCS | Mod: ICN,,, | Performed by: NURSE PRACTITIONER

## 2023-08-15 PROCEDURE — 96372 THER/PROPH/DIAG INJ SC/IM: CPT | Mod: ICN,,, | Performed by: NURSE PRACTITIONER

## 2023-08-15 PROCEDURE — 99213 PR OFFICE/OUTPT VISIT, EST, LEVL III, 20-29 MIN: ICD-10-PCS | Mod: 25,ICN,, | Performed by: NURSE PRACTITIONER

## 2023-08-15 RX ORDER — IPRATROPIUM BROMIDE 21 UG/1
2 SPRAY, METERED NASAL 2 TIMES DAILY
Qty: 30 ML | Refills: 0 | Status: SHIPPED | OUTPATIENT
Start: 2023-08-15

## 2023-08-15 RX ORDER — PROMETHAZINE HYDROCHLORIDE AND DEXTROMETHORPHAN HYDROBROMIDE 6.25; 15 MG/5ML; MG/5ML
5 SYRUP ORAL EVERY 6 HOURS PRN
Qty: 240 ML | Refills: 0 | Status: SHIPPED | OUTPATIENT
Start: 2023-08-15 | End: 2023-08-25

## 2023-08-15 RX ORDER — DEXAMETHASONE SODIUM PHOSPHATE 4 MG/ML
8 INJECTION, SOLUTION INTRA-ARTICULAR; INTRALESIONAL; INTRAMUSCULAR; INTRAVENOUS; SOFT TISSUE ONCE
Status: COMPLETED | OUTPATIENT
Start: 2023-08-15 | End: 2023-08-15

## 2023-08-15 RX ORDER — MONTELUKAST SODIUM 10 MG/1
10 TABLET ORAL NIGHTLY
Qty: 30 TABLET | Refills: 2 | Status: SHIPPED | OUTPATIENT
Start: 2023-08-15 | End: 2023-11-13

## 2023-08-15 RX ORDER — ALBUTEROL SULFATE 90 UG/1
2 AEROSOL, METERED RESPIRATORY (INHALATION) EVERY 6 HOURS PRN
Qty: 18 G | Refills: 3 | Status: SHIPPED | OUTPATIENT
Start: 2023-08-15 | End: 2024-08-14

## 2023-08-15 RX ORDER — FEXOFENADINE HCL AND PSEUDOEPHEDRINE HCI 60; 120 MG/1; MG/1
1 TABLET, EXTENDED RELEASE ORAL 2 TIMES DAILY
Qty: 60 TABLET | Refills: 0 | Status: SHIPPED | OUTPATIENT
Start: 2023-08-15 | End: 2023-09-14

## 2023-08-15 RX ORDER — METHYLPREDNISOLONE 4 MG/1
TABLET ORAL
Qty: 21 EACH | Refills: 0 | Status: SHIPPED | OUTPATIENT
Start: 2023-08-15 | End: 2023-09-05

## 2023-08-15 RX ADMIN — DEXAMETHASONE SODIUM PHOSPHATE 8 MG: 4 INJECTION, SOLUTION INTRA-ARTICULAR; INTRALESIONAL; INTRAMUSCULAR; INTRAVENOUS; SOFT TISSUE at 07:08

## 2023-08-16 NOTE — PROGRESS NOTES
Subjective:       Patient ID: Franky Torres is a 48 y.o. male.    Chief Complaint: Cough and Nasal Congestion (Worse at night. 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   HENT:  Positive for nasal congestion and sinus pressure/congestion. Negative for ear pain and sore throat.    Eyes:  Negative for pain, discharge and itching.   Respiratory:  Positive for cough and wheezing. Negative for shortness of breath.    Cardiovascular:  Negative for chest pain and leg swelling.   Gastrointestinal:  Negative for abdominal pain, change in bowel habit, nausea, vomiting and change in bowel habit.   Musculoskeletal:  Negative for back pain, gait problem and neck pain.   Integumentary:  Negative for rash and wound.   Neurological:  Negative for dizziness, weakness and headaches.   All other systems reviewed and are negative.        Objective:      Physical Exam  Vitals and nursing note reviewed.   Constitutional:       General: He is not in acute distress.     Appearance: Normal appearance. He is not ill-appearing, toxic-appearing or diaphoretic.   HENT:      Head: Normocephalic.      Right Ear: Tympanic membrane, ear canal and external ear normal.      Left Ear: Tympanic membrane, ear canal and external ear normal.      Nose: Mucosal edema and congestion present. No rhinorrhea.      Right Turbinates: Swollen.      Left Turbinates: Swollen.      Mouth/Throat:      Mouth: Mucous membranes are moist.      Pharynx: No oropharyngeal exudate or posterior oropharyngeal erythema.   Eyes:      General: No scleral icterus.        Right eye: No discharge.         Left eye: No discharge.      Extraocular Movements: Extraocular movements intact.      Conjunctiva/sclera: Conjunctivae normal.      Pupils: Pupils  are equal, round, and reactive to light.   Cardiovascular:      Rate and Rhythm: Normal rate and regular rhythm.      Pulses: Normal pulses.      Heart sounds: Normal heart sounds. No murmur heard.  Pulmonary:      Effort: Pulmonary effort is normal. No respiratory distress.      Breath sounds: Wheezing present. No rhonchi or rales.   Musculoskeletal:         General: Normal range of motion.      Cervical back: Neck supple. No tenderness.   Lymphadenopathy:      Cervical: No cervical adenopathy.   Skin:     General: Skin is warm and dry.      Capillary Refill: Capillary refill takes less than 2 seconds.      Findings: No rash.   Neurological:      Mental Status: He is alert and oriented to person, place, and time.   Psychiatric:         Mood and Affect: Mood normal.         Behavior: Behavior normal.         Thought Content: Thought content normal.         Judgment: Judgment normal.            Assessment:       1. Acute non-recurrent maxillary sinusitis    2. Wheezing    3. Chronic bronchitis, unspecified chronic bronchitis type    4. Acute cough        Plan:   Acute non-recurrent maxillary sinusitis  -     dexAMETHasone injection 8 mg  -     methylPREDNISolone (MEDROL DOSEPACK) 4 mg tablet; use as directed  Dispense: 21 each; Refill: 0  -     fexofenadine-pseudoephedrine  mg (ALLEGRA-D)  mg per tablet; Take 1 tablet by mouth 2 (two) times daily.  Dispense: 60 tablet; Refill: 0  -     ipratropium (ATROVENT) 21 mcg (0.03 %) nasal spray; 2 sprays by Each Nostril route 2 (two) times daily.  Dispense: 30 mL; Refill: 0    Wheezing  -     dexAMETHasone injection 8 mg  -     methylPREDNISolone (MEDROL DOSEPACK) 4 mg tablet; use as directed  Dispense: 21 each; Refill: 0  -     albuterol (VENTOLIN HFA) 90 mcg/actuation inhaler; Inhale 2 puffs into the lungs every 6 (six) hours as needed for Wheezing. Rescue  Dispense: 18 g; Refill: 3    Chronic bronchitis, unspecified chronic bronchitis type  -     albuterol  (VENTOLIN HFA) 90 mcg/actuation inhaler; Inhale 2 puffs into the lungs every 6 (six) hours as needed for Wheezing. Rescue  Dispense: 18 g; Refill: 3  -     montelukast (SINGULAIR) 10 mg tablet; Take 1 tablet (10 mg total) by mouth every evening.  Dispense: 30 tablet; Refill: 2    Acute cough  -     promethazine-dextromethorphan (PROMETHAZINE-DM) 6.25-15 mg/5 mL Syrp; Take 5 mLs by mouth every 6 (six) hours as needed (cough).  Dispense: 240 mL; Refill: 0         Risks, benefits, and side effects were discussed with the patient. All questions were answered to the fullest satisfaction of the patient, and pt verbalized understanding and agreement to treatment plan. Pt was to call with any new or worsening symptoms, or present to the ER

## 2023-11-20 PROBLEM — J01.00 ACUTE NON-RECURRENT MAXILLARY SINUSITIS: Status: RESOLVED | Noted: 2023-08-15 | Resolved: 2023-11-20

## 2024-01-28 ENCOUNTER — OFFICE VISIT (OUTPATIENT)
Dept: FAMILY MEDICINE | Facility: CLINIC | Age: 49
End: 2024-01-28
Payer: COMMERCIAL

## 2024-01-28 VITALS
BODY MASS INDEX: 31.4 KG/M2 | OXYGEN SATURATION: 96 % | HEART RATE: 80 BPM | DIASTOLIC BLOOD PRESSURE: 83 MMHG | TEMPERATURE: 98 F | SYSTOLIC BLOOD PRESSURE: 142 MMHG | RESPIRATION RATE: 19 BRPM | HEIGHT: 69 IN | WEIGHT: 212 LBS

## 2024-01-28 DIAGNOSIS — J32.9 SINUSITIS, UNSPECIFIED CHRONICITY, UNSPECIFIED LOCATION: Primary | ICD-10-CM

## 2024-01-28 PROCEDURE — 3077F SYST BP >= 140 MM HG: CPT | Mod: ,,, | Performed by: NURSE PRACTITIONER

## 2024-01-28 PROCEDURE — 99213 OFFICE O/P EST LOW 20 MIN: CPT | Mod: 25,,, | Performed by: NURSE PRACTITIONER

## 2024-01-28 PROCEDURE — 1160F RVW MEDS BY RX/DR IN RCRD: CPT | Mod: ,,, | Performed by: NURSE PRACTITIONER

## 2024-01-28 PROCEDURE — 1159F MED LIST DOCD IN RCRD: CPT | Mod: ,,, | Performed by: NURSE PRACTITIONER

## 2024-01-28 PROCEDURE — 3079F DIAST BP 80-89 MM HG: CPT | Mod: ,,, | Performed by: NURSE PRACTITIONER

## 2024-01-28 PROCEDURE — 99051 MED SERV EVE/WKEND/HOLIDAY: CPT | Mod: ,,, | Performed by: NURSE PRACTITIONER

## 2024-01-28 PROCEDURE — 96372 THER/PROPH/DIAG INJ SC/IM: CPT | Mod: ,,, | Performed by: NURSE PRACTITIONER

## 2024-01-28 PROCEDURE — 3008F BODY MASS INDEX DOCD: CPT | Mod: ,,, | Performed by: NURSE PRACTITIONER

## 2024-01-28 RX ORDER — CEFDINIR 300 MG/1
300 CAPSULE ORAL 2 TIMES DAILY
Qty: 20 CAPSULE | Refills: 0 | Status: SHIPPED | OUTPATIENT
Start: 2024-01-28 | End: 2024-02-07

## 2024-01-28 RX ORDER — PROMETHAZINE HYDROCHLORIDE AND DEXTROMETHORPHAN HYDROBROMIDE 6.25; 15 MG/5ML; MG/5ML
5 SYRUP ORAL EVERY 6 HOURS PRN
Qty: 150 ML | Refills: 0 | Status: SHIPPED | OUTPATIENT
Start: 2024-01-28 | End: 2024-02-07

## 2024-01-28 RX ORDER — DEXAMETHASONE SODIUM PHOSPHATE 4 MG/ML
6 INJECTION, SOLUTION INTRA-ARTICULAR; INTRALESIONAL; INTRAMUSCULAR; INTRAVENOUS; SOFT TISSUE
Status: COMPLETED | OUTPATIENT
Start: 2024-01-28 | End: 2024-01-28

## 2024-01-28 RX ADMIN — DEXAMETHASONE SODIUM PHOSPHATE 6 MG: 4 INJECTION, SOLUTION INTRA-ARTICULAR; INTRALESIONAL; INTRAMUSCULAR; INTRAVENOUS; SOFT TISSUE at 09:01

## 2024-01-28 NOTE — PROGRESS NOTES
"Subjective:       Patient ID: Franky Torres is a 48 y.o. male.    Chief Complaint: congestion (States it is from his job and would not like to be tested today.) and Cough (At night and states he can not sleep. )    Presents to clinic as above. Hx of chronic sinusitis. No fever.       Review of Systems   Constitutional:  Negative for chills, fever and malaise/fatigue.   HENT:  Positive for congestion and sinus pain. Negative for ear pain.    Respiratory:  Positive for cough.    Cardiovascular: Negative.    Musculoskeletal: Negative.    Neurological:  Positive for headaches.          Reviewed family, medical, surgical, and social history.    Objective:      BP (!) 142/83 (BP Location: Left arm, Patient Position: Sitting, BP Method: Large (Automatic))   Pulse 80   Temp 98.2 °F (36.8 °C) (Oral)   Resp 19   Ht 5' 9" (1.753 m)   Wt 96.2 kg (212 lb)   SpO2 96%   BMI 31.31 kg/m²   Physical Exam  Vitals and nursing note reviewed.   Constitutional:       General: He is not in acute distress.     Appearance: Normal appearance. He is normal weight. He is not ill-appearing, toxic-appearing or diaphoretic.   HENT:      Head: Normocephalic and atraumatic.      Right Ear: Hearing, tympanic membrane, ear canal and external ear normal.      Left Ear: Hearing, tympanic membrane, ear canal and external ear normal.      Nose: Nasal tenderness, mucosal edema, congestion and rhinorrhea present. Rhinorrhea is purulent.      Right Turbinates: Enlarged and swollen.      Left Turbinates: Enlarged and swollen.      Right Sinus: Maxillary sinus tenderness and frontal sinus tenderness present.      Left Sinus: Maxillary sinus tenderness and frontal sinus tenderness present.      Mouth/Throat:      Mouth: Mucous membranes are moist.      Pharynx: No oropharyngeal exudate or posterior oropharyngeal erythema.   Cardiovascular:      Rate and Rhythm: Normal rate and regular rhythm.      Heart sounds: Normal heart sounds.   Pulmonary:      " Effort: Pulmonary effort is normal.      Breath sounds: Normal breath sounds.   Musculoskeletal:      Cervical back: Normal range of motion and neck supple. No rigidity or tenderness.   Lymphadenopathy:      Cervical: No cervical adenopathy.   Skin:     General: Skin is warm and dry.   Neurological:      Mental Status: He is alert.   Psychiatric:         Mood and Affect: Mood normal.         Behavior: Behavior normal.         Thought Content: Thought content normal.         Judgment: Judgment normal.            No visits with results within 1 Day(s) from this visit.   Latest known visit with results is:   Office Visit on 02/08/2023   Component Date Value Ref Range Status    SARS Coronavirus 2 Antigen 02/08/2023 Negative  Negative Final    Rapid Influenza A Ag 02/08/2023 Negative  Negative Final    Rapid Influenza B Ag 02/08/2023 Negative  Negative Final     Acceptable 02/08/2023 Yes   Final      Assessment:       1. Sinusitis, unspecified chronicity, unspecified location        Plan:       Sinusitis, unspecified chronicity, unspecified location  -     dexAMETHasone injection 6 mg  -     cefdinir (OMNICEF) 300 MG capsule; Take 1 capsule (300 mg total) by mouth 2 (two) times daily. for 10 days  Dispense: 20 capsule; Refill: 0  -     promethazine-dextromethorphan (PROMETHAZINE-DM) 6.25-15 mg/5 mL Syrp; Take 5 mLs by mouth every 6 (six) hours as needed (cough).  Dispense: 150 mL; Refill: 0    RTC PRN          Risks, benefits, and side effects were discussed with the patient. All questions were answered to the fullest satisfaction of the patient, and pt verbalized understanding and agreement to treatment plan. Pt was to call with any new or worsening symptoms, or present to the ER.

## 2024-01-28 NOTE — LETTER
January 28, 2024      [facility]  [address] [facility] [place] [address]  Phone: [phone]  Fax: [phone]       Patient: Franky Torres   YOB: 1975  Date of Visit: 01/28/2024    To Whom It May Concern:    Pako Torres  was at [facility] on 01/28/2024. The patient may return to work/school on 1/29/24 with no restrictions. If you have any questions or concerns, or if I can be of further assistance, please do not hesitate to contact me.    Sincerely,    Corrina Rodriguez MA

## 2024-02-29 ENCOUNTER — OFFICE VISIT (OUTPATIENT)
Dept: FAMILY MEDICINE | Facility: CLINIC | Age: 49
End: 2024-02-29
Payer: COMMERCIAL

## 2024-02-29 VITALS
OXYGEN SATURATION: 97 % | RESPIRATION RATE: 18 BRPM | HEIGHT: 69 IN | HEART RATE: 102 BPM | DIASTOLIC BLOOD PRESSURE: 90 MMHG | SYSTOLIC BLOOD PRESSURE: 156 MMHG | TEMPERATURE: 98 F | WEIGHT: 219 LBS | BODY MASS INDEX: 32.44 KG/M2

## 2024-02-29 DIAGNOSIS — R03.0 ELEVATED BP WITHOUT DIAGNOSIS OF HYPERTENSION: ICD-10-CM

## 2024-02-29 DIAGNOSIS — Z20.828 EXPOSURE TO VIRAL DISEASE: ICD-10-CM

## 2024-02-29 DIAGNOSIS — R51.9 INTRACTABLE HEADACHE, UNSPECIFIED CHRONICITY PATTERN, UNSPECIFIED HEADACHE TYPE: Primary | ICD-10-CM

## 2024-02-29 LAB
CTP QC/QA: YES
CTP QC/QA: YES
FLUAV AG NPH QL: NEGATIVE
FLUBV AG NPH QL: NEGATIVE
SARS-COV-2 AG RESP QL IA.RAPID: NEGATIVE

## 2024-02-29 PROCEDURE — 96372 THER/PROPH/DIAG INJ SC/IM: CPT | Mod: ,,, | Performed by: NURSE PRACTITIONER

## 2024-02-29 PROCEDURE — 87804 INFLUENZA ASSAY W/OPTIC: CPT | Mod: 59,QW,, | Performed by: NURSE PRACTITIONER

## 2024-02-29 PROCEDURE — 87426 SARSCOV CORONAVIRUS AG IA: CPT | Mod: QW,,, | Performed by: NURSE PRACTITIONER

## 2024-02-29 PROCEDURE — 3080F DIAST BP >= 90 MM HG: CPT | Mod: ,,, | Performed by: NURSE PRACTITIONER

## 2024-02-29 PROCEDURE — 99213 OFFICE O/P EST LOW 20 MIN: CPT | Mod: 25,,, | Performed by: NURSE PRACTITIONER

## 2024-02-29 PROCEDURE — 1159F MED LIST DOCD IN RCRD: CPT | Mod: ,,, | Performed by: NURSE PRACTITIONER

## 2024-02-29 PROCEDURE — 3077F SYST BP >= 140 MM HG: CPT | Mod: ,,, | Performed by: NURSE PRACTITIONER

## 2024-02-29 PROCEDURE — 1160F RVW MEDS BY RX/DR IN RCRD: CPT | Mod: ,,, | Performed by: NURSE PRACTITIONER

## 2024-02-29 PROCEDURE — 3008F BODY MASS INDEX DOCD: CPT | Mod: ,,, | Performed by: NURSE PRACTITIONER

## 2024-02-29 RX ORDER — KETOROLAC TROMETHAMINE 30 MG/ML
60 INJECTION, SOLUTION INTRAMUSCULAR; INTRAVENOUS
Status: DISCONTINUED | OUTPATIENT
Start: 2024-02-29 | End: 2024-02-29

## 2024-02-29 RX ORDER — IBUPROFEN 600 MG/1
600 TABLET ORAL EVERY 6 HOURS PRN
Qty: 30 TABLET | Refills: 0 | Status: SHIPPED | OUTPATIENT
Start: 2024-02-29

## 2024-02-29 RX ORDER — MECLIZINE HYDROCHLORIDE 25 MG/1
25 TABLET ORAL 3 TIMES DAILY PRN
COMMUNITY

## 2024-02-29 RX ORDER — PROMETHAZINE HYDROCHLORIDE 25 MG/ML
25 INJECTION, SOLUTION INTRAMUSCULAR; INTRAVENOUS
Status: COMPLETED | OUTPATIENT
Start: 2024-02-29 | End: 2024-02-29

## 2024-02-29 RX ADMIN — PROMETHAZINE HYDROCHLORIDE 25 MG: 25 INJECTION, SOLUTION INTRAMUSCULAR; INTRAVENOUS at 04:02

## 2024-02-29 NOTE — LETTER
February 29, 2024      [facility]  [address] [place] [address]  Phone: [phone]  Fax: [phone]       Patient: Franky Torres   YOB: 1975  Date of Visit: 02/29/2024    To Whom It May Concern:    Pako Torres  was at [facility] on 02/29/2024. The patient may return to work/school on 03/04/2024 with no restrictions. If you have any questions or concerns, or if I can be of further assistance, please do not hesitate to contact me.    Sincerely,    Ken Person LPN

## 2024-02-29 NOTE — PROGRESS NOTES
"Subjective:       Patient ID: Franky Torres is a 48 y.o. male.    Chief Complaint: Headache, Dizziness (Went to [facility]  Sunday and was told he has vertigo. ), and Emesis    Presents to clinic as above. States dizziness and nausea have resolved. Still has HA. Had a negative CT of head at [facility] ER on Sunday. HA is frontal. No change in vision, slurred speech, or weakness. No nasal congestion or URI s/s. Not worst HA of life.       Review of Systems   Constitutional: Negative.    HENT: Negative.     Respiratory: Negative.     Cardiovascular: Negative.    Gastrointestinal: Negative.    Neurological:  Positive for headaches. Negative for dizziness.          Reviewed family, medical, surgical, and social history.    Objective:      BP (!) 156/90 (BP Location: Left arm, Patient Position: Sitting, BP Method: Medium (Manual))   Pulse 102   Temp 98.4 °F (36.9 °C) (Oral)   Resp 18   Ht 5' 9" (1.753 m)   Wt 99.3 kg (219 lb)   SpO2 97%   BMI 32.34 kg/m²   Physical Exam  Vitals and nursing note reviewed.   Constitutional:       General: He is not in acute distress.     Appearance: Normal appearance. He is normal weight. He is not ill-appearing, toxic-appearing or diaphoretic.   HENT:      Head: Normocephalic.      Right Ear: Tympanic membrane, ear canal and external ear normal.      Left Ear: Tympanic membrane, ear canal and external ear normal.      Nose: Nose normal.      Mouth/Throat:      Mouth: Mucous membranes are moist.      Pharynx: No oropharyngeal exudate or posterior oropharyngeal erythema.   Cardiovascular:      Rate and Rhythm: Normal rate and regular rhythm.      Heart sounds: Normal heart sounds.   Pulmonary:      Effort: Pulmonary effort is normal.      Breath sounds: Normal breath sounds.   Musculoskeletal:      Cervical back: Normal range of motion and neck supple.   Skin:     General: Skin is warm and dry.      Capillary Refill: Capillary refill takes less than 2 seconds.   Neurological:      Mental " Status: He is alert and oriented to person, place, and time. Mental status is at baseline.      Cranial Nerves: No cranial nerve deficit.   Psychiatric:         Mood and Affect: Mood normal.         Behavior: Behavior normal.         Thought Content: Thought content normal.         Judgment: Judgment normal.            Office Visit on 02/29/2024   Component Date Value Ref Range Status    Rapid Influenza A Ag 02/29/2024 Negative  Negative Final    Rapid Influenza B Ag 02/29/2024 Negative  Negative Final     Acceptable 02/29/2024 Yes   Final    SARS Coronavirus 2 Antigen 02/29/2024 Negative  Negative Final     Acceptable 02/29/2024 Yes   Final      Assessment:       1. Intractable headache, unspecified chronicity pattern, unspecified headache type    2. Exposure to viral disease    3. Elevated BP without diagnosis of hypertension        Plan:       Intractable headache, unspecified chronicity pattern, unspecified headache type  -     Discontinue: ketorolac injection 60 mg  -     promethazine injection 25 mg  -     ibuprofen (ADVIL,MOTRIN) 600 MG tablet; Take 1 tablet (600 mg total) by mouth every 6 (six) hours as needed for Pain (HA).  Dispense: 30 tablet; Refill: 0    Exposure to viral disease  -     POCT Influenza A/B Rapid Antigen  -     SARS Coronavirus 2 Antigen, POCT    Elevated BP without diagnosis of hypertension    Notify me if HA persists and I will refer to neurology  Monitor BP at home and F/U if persistently greater than 140/90  RTC PRN          Risks, benefits, and side effects were discussed with the patient. All questions were answered to the fullest satisfaction of the patient, and pt verbalized understanding and agreement to treatment plan. Pt was to call with any new or worsening symptoms, or present to the ER.

## 2024-03-04 ENCOUNTER — CLINICAL SUPPORT (OUTPATIENT)
Dept: PRIMARY CARE CLINIC | Facility: CLINIC | Age: 49
End: 2024-03-04

## 2024-03-04 DIAGNOSIS — Z02.4 ENCOUNTER FOR DEPARTMENT OF TRANSPORTATION (DOT) EXAMINATION FOR DRIVING LICENSE RENEWAL: Primary | ICD-10-CM

## 2024-03-04 DIAGNOSIS — Z00.8 ENCOUNTER FOR PULMONARY FUNCTION TESTING: ICD-10-CM

## 2024-03-04 DIAGNOSIS — Z46.89 ENCOUNTER FOR FITTING AND ADJUSTMENT OF OTHER SPECIFIED DEVICES: ICD-10-CM

## 2024-03-04 DIAGNOSIS — Z02.83 ENCOUNTER FOR DRUG SCREENING: ICD-10-CM

## 2024-03-04 DIAGNOSIS — Z01.10 ENCOUNTER FOR HEARING EXAMINATION, UNSPECIFIED WHETHER ABNORMAL FINDINGS: ICD-10-CM

## 2024-03-04 DIAGNOSIS — Z01.00 ENCOUNTER FOR VISION SCREENING: ICD-10-CM

## 2024-03-04 PROCEDURE — 94010 BREATHING CAPACITY TEST: CPT | Mod: ,,, | Performed by: NURSE PRACTITIONER

## 2024-03-04 PROCEDURE — 99080 SPECIAL REPORTS OR FORMS: CPT | Mod: ,,, | Performed by: NURSE PRACTITIONER

## 2024-03-04 PROCEDURE — 99172 OCULAR FUNCTION SCREEN: CPT | Mod: ,,, | Performed by: NURSE PRACTITIONER

## 2024-03-04 PROCEDURE — 92552 PURE TONE AUDIOMETRY AIR: CPT | Mod: ,,, | Performed by: NURSE PRACTITIONER

## 2024-03-04 PROCEDURE — 99000 SPECIMEN HANDLING OFFICE-LAB: CPT | Mod: ,,, | Performed by: NURSE PRACTITIONER

## 2024-03-04 PROCEDURE — 99499 UNLISTED E&M SERVICE: CPT | Mod: ,,, | Performed by: NURSE PRACTITIONER

## 2024-03-04 NOTE — PROGRESS NOTES
Subjective     Patient ID: Franky Torres is a 48 y.o. male.    Chief Complaint: No chief complaint on file.    HPI  Review of Systems       Objective     Physical Exam       Assessment and Plan     1. Encounter for Department of Transportation (DOT) examination for driving license renewal    2. Encounter for fitting and adjustment of other specified devices    3. Encounter for drug screening    4. Encounter for pulmonary function testing    5. Encounter for hearing examination, unspecified whether abnormal findings    6. Encounter for vision screening        See scanned documents in .            No follow-ups on file.

## 2024-03-12 ENCOUNTER — OFFICE VISIT (OUTPATIENT)
Dept: OTOLARYNGOLOGY | Facility: CLINIC | Age: 49
End: 2024-03-12
Payer: COMMERCIAL

## 2024-03-12 VITALS — BODY MASS INDEX: 32.44 KG/M2 | HEIGHT: 69 IN | WEIGHT: 219 LBS

## 2024-03-12 DIAGNOSIS — R42 VERTIGO: Primary | ICD-10-CM

## 2024-03-12 PROCEDURE — 99204 OFFICE O/P NEW MOD 45 MIN: CPT | Mod: S$PBB,,, | Performed by: OTOLARYNGOLOGY

## 2024-03-12 PROCEDURE — 99213 OFFICE O/P EST LOW 20 MIN: CPT | Mod: PBBFAC | Performed by: OTOLARYNGOLOGY

## 2024-03-12 PROCEDURE — 1159F MED LIST DOCD IN RCRD: CPT | Mod: ,,, | Performed by: OTOLARYNGOLOGY

## 2024-03-12 PROCEDURE — 3008F BODY MASS INDEX DOCD: CPT | Mod: ,,, | Performed by: OTOLARYNGOLOGY

## 2024-03-12 PROCEDURE — 1160F RVW MEDS BY RX/DR IN RCRD: CPT | Mod: ,,, | Performed by: OTOLARYNGOLOGY

## 2024-03-12 NOTE — PROGRESS NOTES
Subjective:       Patient ID: Franky Torres is a 48 y.o. male.    Chief Complaint: Dizziness (Vertigo. )  Drank caffeine worse next day   Dizziness: no hearing loss and no tinnitus.    Review of Systems   HENT:  Negative for hearing loss and tinnitus.    Neurological:  Positive for dizziness.   All other systems reviewed and are negative.      Objective:      Physical Exam  General: NAD  Head: Normocephalic, atraumatic, no facial asymmetry/normal strength,  Ears: Both auricules normal in appearance, w/o deformities tympanic membranes normal external auditory canals normal  Nose: External nose w/o deformities normal turbinates no drainage or inflammation  Oral Cavity: Lips, gums, floor of mouth, tongue hard palate, and buccal mucosa without mass/lesion  Oropharynx: Mucosa pink and moist, soft palate, posterior pharynx and oropharyngeal wall without mass/lesion  Neck: Supple, symmetric, trachea midline, no palpable mass/lesion, no palpable cervical lymphadenopathy  Skin: Warm and dry, no concerning lesions  Respiratory: Respirations even, unlabored  Assessment:       1. Vertigo        Plan:       Dizziness better discussed dietary causes

## 2024-10-30 ENCOUNTER — PATIENT MESSAGE (OUTPATIENT)
Dept: RESEARCH | Facility: HOSPITAL | Age: 49
End: 2024-10-30

## 2025-03-08 ENCOUNTER — OFFICE VISIT (OUTPATIENT)
Dept: FAMILY MEDICINE | Facility: CLINIC | Age: 50
End: 2025-03-08
Payer: COMMERCIAL

## 2025-03-08 VITALS
HEART RATE: 93 BPM | OXYGEN SATURATION: 95 % | DIASTOLIC BLOOD PRESSURE: 88 MMHG | SYSTOLIC BLOOD PRESSURE: 142 MMHG | TEMPERATURE: 98 F | WEIGHT: 214 LBS | BODY MASS INDEX: 31.6 KG/M2

## 2025-03-08 DIAGNOSIS — R06.2 WHEEZING: ICD-10-CM

## 2025-03-08 DIAGNOSIS — J22 LOWER RESPIRATORY TRACT INFECTION: Primary | ICD-10-CM

## 2025-03-08 DIAGNOSIS — R05.1 ACUTE COUGH: ICD-10-CM

## 2025-03-08 PROCEDURE — 1159F MED LIST DOCD IN RCRD: CPT | Mod: ,,, | Performed by: NURSE PRACTITIONER

## 2025-03-08 PROCEDURE — 96372 THER/PROPH/DIAG INJ SC/IM: CPT | Mod: ,,, | Performed by: NURSE PRACTITIONER

## 2025-03-08 PROCEDURE — 3008F BODY MASS INDEX DOCD: CPT | Mod: ,,, | Performed by: NURSE PRACTITIONER

## 2025-03-08 PROCEDURE — 3079F DIAST BP 80-89 MM HG: CPT | Mod: ,,, | Performed by: NURSE PRACTITIONER

## 2025-03-08 PROCEDURE — 99051 MED SERV EVE/WKEND/HOLIDAY: CPT | Mod: ,,, | Performed by: NURSE PRACTITIONER

## 2025-03-08 PROCEDURE — 99213 OFFICE O/P EST LOW 20 MIN: CPT | Mod: 25,,, | Performed by: NURSE PRACTITIONER

## 2025-03-08 PROCEDURE — 3077F SYST BP >= 140 MM HG: CPT | Mod: ,,, | Performed by: NURSE PRACTITIONER

## 2025-03-08 PROCEDURE — 1160F RVW MEDS BY RX/DR IN RCRD: CPT | Mod: ,,, | Performed by: NURSE PRACTITIONER

## 2025-03-08 RX ORDER — DEXAMETHASONE SODIUM PHOSPHATE 4 MG/ML
8 INJECTION, SOLUTION INTRA-ARTICULAR; INTRALESIONAL; INTRAMUSCULAR; INTRAVENOUS; SOFT TISSUE ONCE
Status: COMPLETED | OUTPATIENT
Start: 2025-03-08 | End: 2025-03-08

## 2025-03-08 RX ORDER — AZITHROMYCIN 250 MG/1
TABLET, FILM COATED ORAL
Qty: 6 TABLET | Refills: 0 | Status: SHIPPED | OUTPATIENT
Start: 2025-03-08 | End: 2025-03-13

## 2025-03-08 RX ORDER — CHLOPHEDIANOL HCL AND PYRILAMINE MALEATE 12.5; 12.5 MG/5ML; MG/5ML
10 SOLUTION ORAL 3 TIMES DAILY
Qty: 120 ML | Refills: 1 | Status: SHIPPED | OUTPATIENT
Start: 2025-03-08 | End: 2025-03-13

## 2025-03-08 RX ORDER — ALBUTEROL SULFATE 90 UG/1
2 INHALANT RESPIRATORY (INHALATION) EVERY 6 HOURS PRN
Qty: 18 G | Refills: 3 | Status: SHIPPED | OUTPATIENT
Start: 2025-03-08 | End: 2026-03-08

## 2025-03-08 RX ADMIN — DEXAMETHASONE SODIUM PHOSPHATE 8 MG: 4 INJECTION, SOLUTION INTRA-ARTICULAR; INTRALESIONAL; INTRAMUSCULAR; INTRAVENOUS; SOFT TISSUE at 10:03

## 2025-03-08 NOTE — PROGRESS NOTES
Subjective:       Patient ID: Franky Torres is a 49 y.o. male.    Chief Complaint: Cough (At night pt states more than a week, reports has history of this due to work)    cough    Cough  Pertinent negatives include no chest pain, ear pain, fever, headaches, rash, sore throat or shortness of breath.     Review of Systems   Constitutional:  Negative for appetite change, fatigue and fever.   HENT:  Negative for nasal congestion, ear pain and sore throat.    Eyes:  Negative for pain, discharge and itching.   Respiratory:  Positive for cough. Negative for shortness of breath.    Cardiovascular:  Negative for chest pain and leg swelling.   Gastrointestinal:  Negative for abdominal pain, change in bowel habit, nausea and vomiting.   Musculoskeletal:  Negative for back pain, gait problem and neck pain.   Integumentary:  Negative for rash and wound.   Neurological:  Negative for dizziness, weakness and headaches.   All other systems reviewed and are negative.        Objective:      Physical Exam  Vitals and nursing note reviewed.   Constitutional:       General: He is not in acute distress.     Appearance: Normal appearance. He is not ill-appearing, toxic-appearing or diaphoretic.   HENT:      Head: Normocephalic.      Right Ear: Tympanic membrane, ear canal and external ear normal.      Left Ear: Tympanic membrane, ear canal and external ear normal.      Nose: Congestion present. No rhinorrhea.      Mouth/Throat:      Mouth: Mucous membranes are moist.      Pharynx: Posterior oropharyngeal erythema present. No oropharyngeal exudate.   Eyes:      General: No scleral icterus.        Right eye: No discharge.         Left eye: No discharge.      Extraocular Movements: Extraocular movements intact.      Conjunctiva/sclera: Conjunctivae normal.      Pupils: Pupils are equal, round, and reactive to light.   Cardiovascular:      Rate and Rhythm: Normal rate and regular rhythm.      Pulses: Normal pulses.      Heart sounds: Normal  heart sounds. No murmur heard.  Pulmonary:      Effort: Pulmonary effort is normal. No respiratory distress.      Breath sounds: Wheezing present. No rhonchi or rales.   Musculoskeletal:         General: Normal range of motion.      Cervical back: Normal range of motion and neck supple. No tenderness.   Lymphadenopathy:      Cervical: No cervical adenopathy.   Skin:     General: Skin is warm and dry.      Capillary Refill: Capillary refill takes less than 2 seconds.      Findings: No rash.   Neurological:      Mental Status: He is alert and oriented to person, place, and time.   Psychiatric:         Mood and Affect: Mood normal.         Behavior: Behavior normal.         Thought Content: Thought content normal.         Judgment: Judgment normal.          Assessment:       1. Lower respiratory tract infection    2. Wheezing    3. Acute cough        Plan:   Lower respiratory tract infection  -     azithromycin (Z-RAMAN) 250 MG tablet; Take 2 tablets by mouth on day 1; Take 1 tablet by mouth on days 2-5  Dispense: 6 tablet; Refill: 0    Wheezing  -     albuterol (VENTOLIN HFA) 90 mcg/actuation inhaler; Inhale 2 puffs into the lungs every 6 (six) hours as needed for Wheezing. Rescue  Dispense: 18 g; Refill: 3  -     dexAMETHasone injection 8 mg    Acute cough  -     pyrilamine-chlophedianoL (NINJACOF) 12.5-12.5 mg/5 mL Liqd; Take 10 mLs by mouth 3 (three) times daily. for 5 days  Dispense: 120 mL; Refill: 1           Risks, benefits, and side effects were discussed with the patient. All questions were answered to the fullest satisfaction of the patient, and pt verbalized understanding and agreement to treatment plan. Pt was to call with any new or worsening symptoms, or present to the ER

## 2025-08-28 ENCOUNTER — OFFICE VISIT (OUTPATIENT)
Dept: FAMILY MEDICINE | Facility: CLINIC | Age: 50
End: 2025-08-28
Payer: COMMERCIAL